# Patient Record
Sex: MALE | Race: BLACK OR AFRICAN AMERICAN | Employment: FULL TIME | URBAN - METROPOLITAN AREA
[De-identification: names, ages, dates, MRNs, and addresses within clinical notes are randomized per-mention and may not be internally consistent; named-entity substitution may affect disease eponyms.]

---

## 2017-01-17 ENCOUNTER — OFFICE VISIT (OUTPATIENT)
Dept: FAMILY MEDICINE CLINIC | Facility: CLINIC | Age: 52
End: 2017-01-17

## 2017-01-17 VITALS
SYSTOLIC BLOOD PRESSURE: 154 MMHG | DIASTOLIC BLOOD PRESSURE: 98 MMHG | BODY MASS INDEX: 37 KG/M2 | HEART RATE: 68 BPM | WEIGHT: 280 LBS | RESPIRATION RATE: 20 BRPM

## 2017-01-17 DIAGNOSIS — R94.31 ABNORMAL EKG: ICD-10-CM

## 2017-01-17 DIAGNOSIS — I10 ESSENTIAL HYPERTENSION: Primary | ICD-10-CM

## 2017-01-17 DIAGNOSIS — M99.02 THORACIC REGION SOMATIC DYSFUNCTION: ICD-10-CM

## 2017-01-17 PROBLEM — R03.0 ELEVATED BLOOD PRESSURE READING: Status: ACTIVE | Noted: 2017-01-17

## 2017-01-17 PROCEDURE — 99214 OFFICE O/P EST MOD 30 MIN: CPT | Performed by: FAMILY MEDICINE

## 2017-01-17 PROCEDURE — 93010 ELECTROCARDIOGRAM REPORT: CPT | Performed by: FAMILY MEDICINE

## 2017-01-17 PROCEDURE — 93005 ELECTROCARDIOGRAM TRACING: CPT | Performed by: FAMILY MEDICINE

## 2017-01-17 PROCEDURE — 98927 OSTEOPATH MANJ 5-6 REGIONS: CPT | Performed by: FAMILY MEDICINE

## 2017-01-17 RX ORDER — LOSARTAN POTASSIUM AND HYDROCHLOROTHIAZIDE 12.5; 5 MG/1; MG/1
1 TABLET ORAL DAILY
Qty: 30 TABLET | Refills: 1 | Status: SHIPPED | OUTPATIENT
Start: 2017-01-17 | End: 2017-04-05

## 2017-01-17 RX ORDER — HYDROCODONE BITARTRATE AND ACETAMINOPHEN 5; 325 MG/1; MG/1
1 TABLET ORAL EVERY 6 HOURS PRN
Qty: 40 TABLET | Refills: 0 | Status: SHIPPED | OUTPATIENT
Start: 2017-01-17 | End: 2017-02-16

## 2017-01-17 NOTE — PROCEDURES
Multiple vertebral segments in thoracic region misaligned. Manual osteopathic manipulative therapy performed and immediate relief obtained. Patient instantaneously improved.

## 2017-01-17 NOTE — PATIENT INSTRUCTIONS
Monitor blood pressures and record at home. Limit salt intake. EKG ordered. Recommend weight loss via daily exercising and consistent healthy dietary changes. Start losartan/HCTZ 50/12.5 mg. Monitor blood pressures at home and record.  Will correspond with

## 2017-01-17 NOTE — PROGRESS NOTES
HPI:    Patient ID: Ivelisse Mitchell is a 49year old male. HPI Comments: Dental extraction cancelled today secondary to elevated blood pressures measured at the dental office.     Blood Pressure  Chronicity: For some time wax and waning (borderline) T dizziness, weakness, numbness and headaches. Current Outpatient Prescriptions:  Fluticasone Propionate 50 MCG/ACT Nasal Suspension 2 sprays by Each Nare route daily.  Disp: 1 Bottle Rfl: 0     Allergies:No Known Allergies     01/17/17  0570 01/17 Referrals:  ELECTROCARDIOGRAM, COMPLETE  Patient Instructions   Monitor blood pressures and record at home. Limit salt intake. EKG ordered. Recommend weight loss via daily exercising and consistent healthy dietary changes. Start losartan/HCTZ 50/12.5 mg.  Aloha Frieze

## 2017-01-22 ENCOUNTER — APPOINTMENT (OUTPATIENT)
Dept: GENERAL RADIOLOGY | Facility: HOSPITAL | Age: 52
End: 2017-01-22
Payer: COMMERCIAL

## 2017-01-22 ENCOUNTER — HOSPITAL ENCOUNTER (EMERGENCY)
Facility: HOSPITAL | Age: 52
Discharge: HOME | End: 2017-01-22
Payer: COMMERCIAL

## 2017-01-22 VITALS
RESPIRATION RATE: 16 BRPM | HEART RATE: 70 BPM | OXYGEN SATURATION: 97 % | SYSTOLIC BLOOD PRESSURE: 123 MMHG | HEIGHT: 74 IN | DIASTOLIC BLOOD PRESSURE: 64 MMHG | TEMPERATURE: 103 F | WEIGHT: 269 LBS | BODY MASS INDEX: 34.52 KG/M2

## 2017-01-22 DIAGNOSIS — J18.9 COMMUNITY ACQUIRED PNEUMONIA: Primary | ICD-10-CM

## 2017-01-22 LAB
BASOPHILS # BLD: 0.1 K/UL (ref 0–0.2)
BASOPHILS NFR BLD: 1 %
EOSINOPHIL # BLD: 0.2 K/UL (ref 0–0.7)
EOSINOPHIL NFR BLD: 3 %
ERYTHROCYTE [DISTWIDTH] IN BLOOD BY AUTOMATED COUNT: 12.8 % (ref 11–15)
HCT VFR BLD AUTO: 44.4 % (ref 41–52)
HGB BLD-MCNC: 14.8 G/DL (ref 13.5–17.5)
LYMPHOCYTES # BLD: 0.8 K/UL (ref 1–4)
LYMPHOCYTES NFR BLD: 10 %
MCH RBC QN AUTO: 27.6 PG (ref 27–32)
MCHC RBC AUTO-ENTMCNC: 33.5 G/DL (ref 32–37)
MCV RBC AUTO: 82.4 FL (ref 80–100)
MONOCYTES # BLD: 1.2 K/UL (ref 0–1)
MONOCYTES NFR BLD: 15 %
NEUTROPHILS # BLD AUTO: 5.8 K/UL (ref 1.8–7.7)
NEUTROPHILS NFR BLD: 71 %
PLATELET # BLD AUTO: 169 K/UL (ref 140–400)
PMV BLD AUTO: 8.1 FL (ref 7.4–10.3)
RBC # BLD AUTO: 5.38 M/UL (ref 4.5–5.9)
WBC # BLD AUTO: 8.1 K/UL (ref 4–11)

## 2017-01-22 PROCEDURE — 85025 COMPLETE CBC W/AUTO DIFF WBC: CPT | Performed by: EMERGENCY MEDICINE

## 2017-01-22 PROCEDURE — 99284 EMERGENCY DEPT VISIT MOD MDM: CPT

## 2017-01-22 PROCEDURE — 87040 BLOOD CULTURE FOR BACTERIA: CPT | Performed by: EMERGENCY MEDICINE

## 2017-01-22 PROCEDURE — 96365 THER/PROPH/DIAG IV INF INIT: CPT

## 2017-01-22 PROCEDURE — 71020 XR CHEST PA + LAT CHEST (CPT=71020): CPT

## 2017-01-22 PROCEDURE — 36415 COLL VENOUS BLD VENIPUNCTURE: CPT

## 2017-01-22 RX ORDER — ACETAMINOPHEN 500 MG
1000 TABLET ORAL ONCE
Status: COMPLETED | OUTPATIENT
Start: 2017-01-22 | End: 2017-01-22

## 2017-01-22 RX ORDER — IBUPROFEN 600 MG/1
600 TABLET ORAL ONCE
Status: COMPLETED | OUTPATIENT
Start: 2017-01-22 | End: 2017-01-22

## 2017-01-22 RX ORDER — AZITHROMYCIN 250 MG/1
TABLET, FILM COATED ORAL
Qty: 1 PACKAGE | Refills: 0 | Status: SHIPPED | OUTPATIENT
Start: 2017-01-22 | End: 2017-01-27

## 2017-01-22 RX ORDER — TRAMADOL HYDROCHLORIDE 50 MG/1
50 TABLET ORAL EVERY 4 HOURS PRN
Qty: 14 TABLET | Refills: 0 | Status: SHIPPED | OUTPATIENT
Start: 2017-01-22 | End: 2017-01-29

## 2017-01-22 RX ORDER — BENZONATATE 100 MG/1
100 CAPSULE ORAL 3 TIMES DAILY PRN
Qty: 30 CAPSULE | Refills: 0 | Status: SHIPPED | OUTPATIENT
Start: 2017-01-22 | End: 2017-02-16

## 2017-01-22 RX ORDER — PREDNISONE 20 MG/1
40 TABLET ORAL DAILY
Qty: 6 TABLET | Refills: 0 | Status: SHIPPED | OUTPATIENT
Start: 2017-01-22 | End: 2017-01-25

## 2017-01-22 NOTE — ED PROVIDER NOTES
Patient Seen in: [facility] AND [facility] Emergency Department    History   Patient presents with:  Cough    Stated Complaint: Shortness of Breath, headaches, chest pains, cough    HPI    Patient complains of shortness of breath that began few days ago.   Micah Burrell reviewed and negative except as noted above. PSFH elements reviewed from today and agreed except as otherwise stated in HPI.     Physical Exam       ED Triage Vitals   BP 01/22/17 0053 135/75 mmHg   Pulse 01/22/17 0053 79   Resp 01/22/17 0053 16   Temp 0 acquired pneumonia  (primary encounter diagnosis)    Disposition:  Discharge    Follow-up:  Millie Cortez DO  [address] [place] [address] [id number] [facility]  [phone]            Medications Prescribed:  Discharge Medication List as of 1/22/2017

## 2017-02-06 ENCOUNTER — TELEPHONE (OUTPATIENT)
Dept: FAMILY MEDICINE CLINIC | Facility: CLINIC | Age: 52
End: 2017-02-06

## 2017-02-06 NOTE — TELEPHONE ENCOUNTER
Patient asking if he can get an apt with  re: his Blood pressure is ok to get a tooth pulled. They will not pull his tooth till they get OK from the doctor re: blood pressure.    The sooner he gets response he can get his tooth pulled

## 2017-02-16 ENCOUNTER — OFFICE VISIT (OUTPATIENT)
Dept: FAMILY MEDICINE CLINIC | Facility: CLINIC | Age: 52
End: 2017-02-16

## 2017-02-16 VITALS
DIASTOLIC BLOOD PRESSURE: 92 MMHG | HEIGHT: 73 IN | RESPIRATION RATE: 16 BRPM | HEART RATE: 80 BPM | SYSTOLIC BLOOD PRESSURE: 130 MMHG | TEMPERATURE: 99 F

## 2017-02-16 DIAGNOSIS — I10 ESSENTIAL HYPERTENSION: Primary | ICD-10-CM

## 2017-02-16 PROCEDURE — 99213 OFFICE O/P EST LOW 20 MIN: CPT | Performed by: FAMILY MEDICINE

## 2017-02-16 PROCEDURE — 99212 OFFICE O/P EST SF 10 MIN: CPT | Performed by: FAMILY MEDICINE

## 2017-02-16 NOTE — PROGRESS NOTES
HPI:    Patient ID: Marcella Art is a 49year old male. HPI Comments: Here for clearance for planned tooth extraction. Blood Pressure  This is a new problem. The current episode started more than 1 month ago. The problem occurs intermittently.

## 2017-02-16 NOTE — PATIENT INSTRUCTIONS
Comply with medications. Monitor blood pressures and record at home. Limit salt intake. Cleared for extraction.

## 2017-04-05 ENCOUNTER — TELEPHONE (OUTPATIENT)
Dept: FAMILY MEDICINE CLINIC | Facility: CLINIC | Age: 52
End: 2017-04-05

## 2017-04-05 RX ORDER — LOSARTAN POTASSIUM AND HYDROCHLOROTHIAZIDE 12.5; 5 MG/1; MG/1
1 TABLET ORAL DAILY
Qty: 30 TABLET | Refills: 1 | Status: SHIPPED | OUTPATIENT
Start: 2017-04-05 | End: 2018-04-03 | Stop reason: ALTCHOICE

## 2017-04-05 NOTE — TELEPHONE ENCOUNTER
Hypertensive Medications: Refill protocol failed because the patient did not meet the protocol criteria.  Please advise in regards to refill request     Protocol Criteria:  · Appointment scheduled in the past 6 months or in the next 3 months  · BMP or CMP i

## 2017-04-05 NOTE — TELEPHONE ENCOUNTER
Dr.Weiler for  see Bettina Phoenix message below  pt called again and stated that his gums are swollen and was seen in the ER 1/2017 he was given z-nury and tramadol and it worked for his swollen gums. He wants this medication to be prescribed today.

## 2017-04-05 NOTE — TELEPHONE ENCOUNTER
Please advise. Thank you. To Dr. Joseph Vance for Dr. Osvaldo Rascon out of office    Pt was called back and MD message below relayed to him but he states that he is very upset and wants to speak with the doctor directly today and he does not want a nurse to call him back.

## 2017-04-05 NOTE — TELEPHONE ENCOUNTER
Patient is asking for the antibiotic that the ER wrote for h im  And he out of his BP medication.  DOES NOT KNOW THE NAME HE KEEPS STATING IT SHOULD BE IN THE FILE

## 2017-04-05 NOTE — TELEPHONE ENCOUNTER
I don't like to prescribe antibiotics over the phone, especially for a problem I have not seen.   He can wait for JOURDAN to reiew tomorrow as well

## 2017-04-05 NOTE — TELEPHONE ENCOUNTER
(Please see below as well )    Patient states this has been an issue since January - states has not been able to get tooth pulled. States at first it was his blood pressure that he could not get his tooth pulled and then it became an insurance issue.  State

## 2017-04-06 NOTE — TELEPHONE ENCOUNTER
Dr Lino Quach is right. This is a dental issue which is impeding a dental procedure and we cannot presume anything without an evaluation. He should contact his dentist or give him a same day with me.  DO NOT PUT THIS PATIENT OR ANY PATIENT IN MY 04/07/17 1:40 p

## 2017-04-06 NOTE — TELEPHONE ENCOUNTER
Pt returned call, reviewed doctor's recommendations for contacting dentist or scheduling appt. Pt states he will contact his dentist and call back if further assistance needed.

## 2017-04-06 NOTE — TELEPHONE ENCOUNTER
Spoke with patient. Patient is very ipset with me. Informed him that I reviewed the ER report and that he was treat for CAP. Pt states he was given Zpack for tooth infection. Diagnosis not listed.   I informed pt that zpack is not typically used for dent

## 2018-02-09 ENCOUNTER — NURSE TRIAGE (OUTPATIENT)
Dept: OTHER | Age: 53
End: 2018-02-09

## 2018-02-10 ENCOUNTER — OFFICE VISIT (OUTPATIENT)
Dept: FAMILY MEDICINE CLINIC | Facility: CLINIC | Age: 53
End: 2018-02-10

## 2018-02-10 VITALS
HEIGHT: 75 IN | BODY MASS INDEX: 33.32 KG/M2 | TEMPERATURE: 100 F | WEIGHT: 268 LBS | DIASTOLIC BLOOD PRESSURE: 94 MMHG | SYSTOLIC BLOOD PRESSURE: 132 MMHG | HEART RATE: 85 BPM | RESPIRATION RATE: 14 BRPM

## 2018-02-10 DIAGNOSIS — J01.90 ACUTE SINUSITIS, RECURRENCE NOT SPECIFIED, UNSPECIFIED LOCATION: Primary | ICD-10-CM

## 2018-02-10 PROCEDURE — 99212 OFFICE O/P EST SF 10 MIN: CPT | Performed by: FAMILY MEDICINE

## 2018-02-10 PROCEDURE — 99213 OFFICE O/P EST LOW 20 MIN: CPT | Performed by: FAMILY MEDICINE

## 2018-02-10 RX ORDER — AMOXICILLIN AND CLAVULANATE POTASSIUM 875; 125 MG/1; MG/1
1 TABLET, FILM COATED ORAL 2 TIMES DAILY
Qty: 20 TABLET | Refills: 0 | Status: SHIPPED | OUTPATIENT
Start: 2018-02-10 | End: 2018-02-20

## 2018-02-10 NOTE — PROGRESS NOTES
HPI:    Patient ID: Glenn Doss is a 47year old male. Sinus Problem   This is a new problem. The current episode started in the past 7 days. The problem is unchanged. The pain is mild.        Review of Systems         Current Outpatient Prescript

## 2018-02-12 ENCOUNTER — NURSE TRIAGE (OUTPATIENT)
Dept: OTHER | Age: 53
End: 2018-02-12

## 2018-02-12 RX ORDER — AZITHROMYCIN 250 MG/1
TABLET, FILM COATED ORAL
Qty: 6 TABLET | Refills: 0 | Status: SHIPPED | OUTPATIENT
Start: 2018-02-12 | End: 2018-04-03 | Stop reason: ALTCHOICE

## 2018-02-12 NOTE — TELEPHONE ENCOUNTER
Action Requested: Summary for Provider     []  Critical Lab, Recommendations Needed  [] Need Additional Advice  []   FYI    []   Need Orders  [] Need Medications Sent to Pharmacy  []  Other     SUMMARY: Dr. Gabo Jackson: patient asked if different abx can be rec

## 2018-04-03 ENCOUNTER — LAB ENCOUNTER (OUTPATIENT)
Dept: LAB | Age: 53
End: 2018-04-03
Attending: FAMILY MEDICINE
Payer: COMMERCIAL

## 2018-04-03 ENCOUNTER — OFFICE VISIT (OUTPATIENT)
Dept: FAMILY MEDICINE CLINIC | Facility: CLINIC | Age: 53
End: 2018-04-03

## 2018-04-03 VITALS
RESPIRATION RATE: 17 BRPM | SYSTOLIC BLOOD PRESSURE: 116 MMHG | HEART RATE: 76 BPM | DIASTOLIC BLOOD PRESSURE: 82 MMHG | HEIGHT: 75 IN | TEMPERATURE: 99 F | BODY MASS INDEX: 33.82 KG/M2 | WEIGHT: 272 LBS

## 2018-04-03 DIAGNOSIS — Z13.29 THYROID DISORDER SCREEN: ICD-10-CM

## 2018-04-03 DIAGNOSIS — Z11.3 SCREEN FOR STD (SEXUALLY TRANSMITTED DISEASE): ICD-10-CM

## 2018-04-03 DIAGNOSIS — Z13.220 LIPID SCREENING: ICD-10-CM

## 2018-04-03 DIAGNOSIS — Z12.5 PROSTATE CANCER SCREENING: ICD-10-CM

## 2018-04-03 DIAGNOSIS — Z00.00 ROUTINE PHYSICAL EXAMINATION: Primary | ICD-10-CM

## 2018-04-03 DIAGNOSIS — Z00.00 ROUTINE PHYSICAL EXAMINATION: ICD-10-CM

## 2018-04-03 PROCEDURE — 36415 COLL VENOUS BLD VENIPUNCTURE: CPT

## 2018-04-03 PROCEDURE — 80050 GENERAL HEALTH PANEL: CPT

## 2018-04-03 PROCEDURE — 80061 LIPID PANEL: CPT

## 2018-04-03 PROCEDURE — 81003 URINALYSIS AUTO W/O SCOPE: CPT

## 2018-04-03 PROCEDURE — 87591 N.GONORRHOEAE DNA AMP PROB: CPT

## 2018-04-03 PROCEDURE — 99396 PREV VISIT EST AGE 40-64: CPT | Performed by: FAMILY MEDICINE

## 2018-04-03 PROCEDURE — 85025 COMPLETE CBC W/AUTO DIFF WBC: CPT

## 2018-04-03 PROCEDURE — 84443 ASSAY THYROID STIM HORMONE: CPT

## 2018-04-03 PROCEDURE — 87491 CHLMYD TRACH DNA AMP PROBE: CPT

## 2018-04-03 RX ORDER — METRONIDAZOLE 500 MG/1
TABLET ORAL
Qty: 4 TABLET | Refills: 0 | Status: SHIPPED | OUTPATIENT
Start: 2018-04-03

## 2018-04-03 NOTE — PROGRESS NOTES
HPI:    Patient ID: Lavinia De Santiago is a 47year old male.     47year old AA male here for complete preventive care physical and for status update on any confirmed chronic medical illnesses and follow up on any previous labs or procedures that were sugg TSH W REFLEX TO FREE T4; Future    5.  STD screen  Chlamydia/gonorrhea tested      Orders Placed This Encounter      Lipid Panel [E]      Comp Metabolic Panel (14) [E]      CBC W Differential W Platelet [E]      TSH W Reflex To Free T4 [E]      Urinalysis,

## 2018-06-07 ENCOUNTER — TELEPHONE (OUTPATIENT)
Dept: FAMILY MEDICINE CLINIC | Facility: CLINIC | Age: 53
End: 2018-06-07

## 2018-06-11 ENCOUNTER — NURSE ONLY (OUTPATIENT)
Dept: FAMILY MEDICINE CLINIC | Facility: CLINIC | Age: 53
End: 2018-06-11

## 2018-06-11 DIAGNOSIS — R94.31 ABNORMAL FINDING ON EKG: Primary | ICD-10-CM

## 2018-06-11 DIAGNOSIS — I10 ESSENTIAL HYPERTENSION: ICD-10-CM

## 2018-06-11 DIAGNOSIS — R94.31 EKG, ABNORMAL: Primary | ICD-10-CM

## 2018-06-20 ENCOUNTER — HOSPITAL ENCOUNTER (OUTPATIENT)
Dept: CV DIAGNOSTICS | Facility: HOSPITAL | Age: 53
Discharge: HOME | End: 2018-06-20
Attending: FAMILY MEDICINE
Payer: COMMERCIAL

## 2018-06-20 DIAGNOSIS — R94.31 EKG, ABNORMAL: ICD-10-CM

## 2018-06-20 DIAGNOSIS — I10 ESSENTIAL HYPERTENSION: ICD-10-CM

## 2018-06-20 PROCEDURE — 93016 CV STRESS TEST SUPVJ ONLY: CPT | Performed by: FAMILY MEDICINE

## 2018-06-20 PROCEDURE — 93017 CV STRESS TEST TRACING ONLY: CPT | Performed by: FAMILY MEDICINE

## 2018-06-20 PROCEDURE — 93018 CV STRESS TEST I&R ONLY: CPT | Performed by: FAMILY MEDICINE

## 2018-09-11 ENCOUNTER — MED REC SCAN ONLY (OUTPATIENT)
Dept: FAMILY MEDICINE CLINIC | Facility: CLINIC | Age: 53
End: 2018-09-11

## 2020-11-23 ENCOUNTER — TELEPHONE (OUTPATIENT)
Dept: FAMILY MEDICINE CLINIC | Facility: CLINIC | Age: 55
End: 2020-11-23

## 2020-11-23 NOTE — TELEPHONE ENCOUNTER
Per patient requesting to know if her could  get in sooner then 01/25/2021  For a physical / that's Theo Terrazas next available appointment / please advise.

## 2022-10-19 NOTE — TELEPHONE ENCOUNTER
Call transferred to RN from [address] [place] [address]. Informed pt BP med refilled and reviewed doctor's recommendations regarding antibiotic prescription.  Pt is insistent that an antibiotic be prescribed for him, states this was prescribed for him in ER and he doesn't need to Pt is requesting a c/b regarding prescription of Norco.  He reports he filled his prescription last month on the 19th, but there is a do not fill date until 10/22/22 on his prescription for this month.  Pt is scheduled to have MARLYN tomorrow and reports he needed to fill his prescription prior to procedure.  # [phone]

## 2024-01-08 ENCOUNTER — TELEPHONE (OUTPATIENT)
Dept: FAMILY MEDICINE CLINIC | Facility: CLINIC | Age: 59
End: 2024-01-08

## 2024-01-08 NOTE — TELEPHONE ENCOUNTER
Routing as CECILY.      Patient states that our office called him today. He did not want to give me his birthday at first to pull up his chart. I advised there is more than one Avel Rivas in our system and I need to pull up the right chart in order to help him. He disagreed with me but gave me his birthday anyways. He has an appointment scheduled for a new patient physical today and states he already spoke to someone. I do not have any documentation as to who called patient and advised that it might have been scheduling to remind him to schedule his physical.     He has complaints about scheduling. States that he can never get an appointment with Dr. Saavedra. He states he knows Dr. Saavedra personally because they went to school together. Also states that multiple friends of theirs have the same problem. The only recent documentation I can see is from 11/23/20 of him asking to be seen sooner for an appointment for physical. He states that call was not for a physical and it was to check to see if he had Covid. There is no triage encounter for this to triage patient for Covid. I also advised that the last time patient was seen in the office was in 2018. He states he has not seen Dr. Saavedra because of this reason and has been following up with the [facility]. I advised him that Dr. Saavedra's schedule fills up quickly and to try to schedule his physicals 6 months out in order to ensure he gets the time/date he prefers. I also recommended that if he has issues with the scheduling department he can always pick the option to speak to a nurse if he has an acute issue. I also sent patient a link to sign up for [facility]. Patient verbalized understanding and disconnected call.    Future Appointments   Date Time Provider Department Center   1/8/2024  6:00 PM [facility]

## 2024-03-12 ENCOUNTER — TELEPHONE (OUTPATIENT)
Dept: FAMILY MEDICINE CLINIC | Facility: CLINIC | Age: 59
End: 2024-03-12

## 2024-03-12 NOTE — TELEPHONE ENCOUNTER
Patient called and is requesting appt with  decline other providers he said he develop a hernia and has been hurting,  next available is in June. Please advise. He said he has spoken to  last month.

## 2024-03-14 ENCOUNTER — OFFICE VISIT (OUTPATIENT)
Dept: FAMILY MEDICINE CLINIC | Facility: CLINIC | Age: 59
End: 2024-03-14

## 2024-03-14 VITALS
DIASTOLIC BLOOD PRESSURE: 79 MMHG | SYSTOLIC BLOOD PRESSURE: 120 MMHG | BODY MASS INDEX: 28 KG/M2 | WEIGHT: 221 LBS | HEART RATE: 74 BPM

## 2024-03-14 DIAGNOSIS — Z12.5 ENCOUNTER FOR PROSTATE CANCER SCREENING: ICD-10-CM

## 2024-03-14 DIAGNOSIS — Z28.21 TETANUS, DIPHTHERIA, AND ACELLULAR PERTUSSIS (TDAP) VACCINATION DECLINED: ICD-10-CM

## 2024-03-14 DIAGNOSIS — Z28.21 HERPES ZOSTER VACCINATION DECLINED: ICD-10-CM

## 2024-03-14 DIAGNOSIS — E11.65 UNCONTROLLED TYPE 2 DIABETES MELLITUS WITH HYPERGLYCEMIA (HCC): Primary | ICD-10-CM

## 2024-03-14 DIAGNOSIS — Z12.11 COLON CANCER SCREENING: ICD-10-CM

## 2024-03-14 DIAGNOSIS — R73.09 ELEVATED HEMOGLOBIN A1C MEASUREMENT: ICD-10-CM

## 2024-03-14 DIAGNOSIS — K40.90 NON-RECURRENT UNILATERAL INGUINAL HERNIA WITHOUT OBSTRUCTION OR GANGRENE: ICD-10-CM

## 2024-03-14 DIAGNOSIS — Z13.29 THYROID DISORDER SCREEN: ICD-10-CM

## 2024-03-14 LAB — HEMOGLOBIN A1C: 15 % (ref 4.3–5.6)

## 2024-03-14 PROCEDURE — 3046F HEMOGLOBIN A1C LEVEL >9.0%: CPT | Performed by: FAMILY MEDICINE

## 2024-03-14 PROCEDURE — 3074F SYST BP LT 130 MM HG: CPT | Performed by: FAMILY MEDICINE

## 2024-03-14 PROCEDURE — 99214 OFFICE O/P EST MOD 30 MIN: CPT | Performed by: FAMILY MEDICINE

## 2024-03-14 PROCEDURE — 3078F DIAST BP <80 MM HG: CPT | Performed by: FAMILY MEDICINE

## 2024-03-14 PROCEDURE — 83036 HEMOGLOBIN GLYCOSYLATED A1C: CPT | Performed by: FAMILY MEDICINE

## 2024-03-14 NOTE — PROGRESS NOTES
Subjective:     Patient ID: Avel Rivas is a 58 year old male.    This patient is a 58-year-old -American gentleman who has not been seen by me at this clinic for some time who presents today for a referral to general surgery as he is pretty sure that he has developed a right inguinal hernia which is work-related.  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  Some days to weeks later the discomfort persisted at which point he noted a bulging in the right suprapubic region.    Additionally the patient reports dry mouth and urinary frequency and a reported elevated high triglyceride, and elevated blood sugars.  The patient had a hospitalization which revealed an A1c of 10.4 and was initially placed on insulin for blood sugar controls.  Patient states that he modified his oral intake activity and adjusted to a healthy lifestyle and feels as though he is doing well concerning his diabetes.    In spite of what he thinks, his blood sugars have been measuring high.  Patient needs an updated assessment regarding his diabetes.          History/Other:   Review of Systems  No current outpatient medications on file.     Allergies:No Known Allergies    Past Medical History:   Diagnosis Date    Hypertension     Lipid screening 11/8/13      Past Surgical History:   Procedure Laterality Date    ELECTROCARDIOGRAM, COMPLETE  11/8/13    Scanned to Media Tab    HERNIA SURGERY  2002    Per NG Hernia repair    TONSILLECTOMY  2008      Family History   Problem Relation Age of Onset    Breast Cancer Mother 58    Melanoma Father 58        Per [facility] Cancer - melanoma      Social History:   Social History     Socioeconomic History    Marital status: Single   Tobacco Use    Smoking status: Never    Smokeless tobacco: Never   Substance and Sexual Activity    Alcohol use: Yes     Alcohol/week: 2.0 standard drinks of  alcohol     Types: 2 Cans of beer per week    Drug use: No   Other Topics Concern    Caffeine Concern Yes     Comment: Soda        Objective:   Vitals:    03/14/24 1105   BP: 120/79   Pulse: 74       Physical Exam  HENT:      Head: Normocephalic and atraumatic.      Right Ear: Tympanic membrane normal.      Left Ear: Tympanic membrane normal.      Nose: Nose normal.      Mouth/Throat:      Mouth: Mucous membranes are moist.   Neck:      Thyroid: No thyromegaly.   Cardiovascular:      Rate and Rhythm: Normal rate and regular rhythm.      Heart sounds:      No gallop.   Pulmonary:      Effort: Pulmonary effort is normal. No respiratory distress.      Breath sounds: Normal breath sounds.   Abdominal:      Hernia: A hernia is present. Hernia is present in the right inguinal area.          Comments: Direct/indirect right inguinal hernia as depicted.   Neurological:      Mental Status: He is alert and oriented to person, place, and time.         Assessment & Plan:   1. Uncontrolled type 2 diabetes mellitus with hyperglycemia (HCC)  The following labs have been ordered.  Patient has been referred to diabetic navigator along with endocrinology for optimal diabetic care.  - Urinalysis, Routine; Future  - CBC With Differential With Platelet; Future  - Comp Metabolic Panel (14); Future  - Lipid Panel; Future  - Protein,Total,Urine, Random [E]; Future  - Diabetes Navigator  - Endocrine Referral - [place] ([place])    2. Elevated hemoglobin A1c measurement  A1c on today greater than 15.  Poor control.  See #1.  - Hemoglobin A1C; Standing  - Hemoglobin A1C  - POC Glycohemoglobin [72015]    3. Non-recurrent unilateral inguinal hernia without obstruction or gangrene  Patient being referred, however surgery will not be able to take place until blood sugar is showing a respectable trend of control.  - Surgery Referral - In Network    4. Colon cancer screening  Referred.  - Gastro GI Telephone Colon Screen; Future    5. Thyroid  disorder screen  Screened.  - TSH W Reflex To Free T4; Future    6. Encounter for prostate cancer screening  Screened.  - PSA Total, Screen; Future    7. Tetanus, diphtheria, and acellular pertussis (Tdap) vaccination declined  Tdap declined.    8. Herpes zoster vaccination declined  Tdap declined.        No orders of the defined types were placed in this encounter.      Meds This Visit:  Requested Prescriptions      No prescriptions requested or ordered in this encounter       Imaging & Referrals:  OP REFERRAL TO [facility] GI TELEPHONE COLON SCREEN     Patient Instructions   All adult screening ordered and done appropriate for patient's age and gender and risk factors and complaints.  Encouraged safe physical fitness and daily physical activity daily.  Referred to endocrine, general surgery, and diabetic navigator.    Return in about 6 weeks (around 4/25/2024), or if symptoms worsen or fail to improve.

## 2024-03-14 NOTE — PATIENT INSTRUCTIONS
All adult screening ordered and done appropriate for patient's age and gender and risk factors and complaints.  Encouraged safe physical fitness and daily physical activity daily.  Referred to endocrine, general surgery, and diabetic navigator.

## 2024-03-22 ENCOUNTER — TELEPHONE (OUTPATIENT)
Dept: ENDOCRINOLOGY | Facility: HOSPITAL | Age: 59
End: 2024-03-22

## 2024-04-22 ENCOUNTER — OFFICE VISIT (OUTPATIENT)
Dept: ENDOCRINOLOGY CLINIC | Facility: CLINIC | Age: 59
End: 2024-04-22
Payer: COMMERCIAL

## 2024-04-22 VITALS
WEIGHT: 222 LBS | BODY MASS INDEX: 27.6 KG/M2 | SYSTOLIC BLOOD PRESSURE: 117 MMHG | DIASTOLIC BLOOD PRESSURE: 70 MMHG | HEART RATE: 83 BPM | HEIGHT: 75 IN

## 2024-04-22 DIAGNOSIS — R03.0 ELEVATED BLOOD PRESSURE READING: Primary | ICD-10-CM

## 2024-04-22 DIAGNOSIS — R73.09 HIGH GLUCOSE LEVEL: ICD-10-CM

## 2024-04-22 DIAGNOSIS — E11.65 UNCONTROLLED TYPE 2 DIABETES MELLITUS WITH HYPERGLYCEMIA (HCC): ICD-10-CM

## 2024-04-22 DIAGNOSIS — R79.89 LOW VITAMIN D LEVEL: ICD-10-CM

## 2024-04-22 LAB
TEST STRIP EXPIRATION DATE: NORMAL DATE
TEST STRIP LOT #: NORMAL NUMERIC

## 2024-04-22 RX ORDER — INSULIN GLARGINE 100 [IU]/ML
20 INJECTION, SOLUTION SUBCUTANEOUS NIGHTLY
Qty: 18 ML | Refills: 0 | Status: SHIPPED | OUTPATIENT
Start: 2024-04-22 | End: 2024-07-21

## 2024-04-22 RX ORDER — BLOOD SUGAR DIAGNOSTIC
1 STRIP MISCELLANEOUS 3 TIMES DAILY
Qty: 300 STRIP | Refills: 1 | Status: SHIPPED | OUTPATIENT
Start: 2024-04-22 | End: 2024-07-21

## 2024-04-22 RX ORDER — METFORMIN HYDROCHLORIDE 500 MG/1
1000 TABLET, EXTENDED RELEASE ORAL 2 TIMES DAILY WITH MEALS
Qty: 360 TABLET | Refills: 1 | Status: SHIPPED | OUTPATIENT
Start: 2024-04-22 | End: 2024-10-19

## 2024-04-22 RX ORDER — BLOOD SUGAR DIAGNOSTIC
1 STRIP MISCELLANEOUS 4 TIMES DAILY
Qty: 100 EACH | Refills: 2 | Status: SHIPPED | OUTPATIENT
Start: 2024-04-22 | End: 2024-07-21

## 2024-04-22 RX ORDER — BLOOD-GLUCOSE,RECEIVER,CONT
1 EACH MISCELLANEOUS ONCE
Qty: 1 EACH | Refills: 0 | Status: SHIPPED | OUTPATIENT
Start: 2024-04-22 | End: 2024-04-22

## 2024-04-22 RX ORDER — BLOOD-GLUCOSE SENSOR
1 EACH MISCELLANEOUS
Qty: 6 EACH | Refills: 1 | Status: SHIPPED | OUTPATIENT
Start: 2024-04-22 | End: 2024-07-21

## 2024-04-22 RX ORDER — PIOGLITAZONEHYDROCHLORIDE 30 MG/1
30 TABLET ORAL DAILY
Qty: 90 TABLET | Refills: 1 | Status: SHIPPED | OUTPATIENT
Start: 2024-04-22 | End: 2024-10-19

## 2024-04-22 RX ORDER — EMPAGLIFLOZIN 25 MG/1
1 TABLET, FILM COATED ORAL DAILY
Qty: 30 TABLET | Refills: 1 | Status: SHIPPED | OUTPATIENT
Start: 2024-04-22 | End: 2024-05-22

## 2024-04-22 RX ORDER — GLIPIZIDE 5 MG/1
5 TABLET ORAL
Qty: 60 TABLET | Refills: 2 | Status: SHIPPED | OUTPATIENT
Start: 2024-04-22 | End: 2024-07-21

## 2024-04-22 NOTE — PROGRESS NOTES
New Patient Evaluation - History and Physical    CONSULT - Reason for Visit:  uncontrolled DM  Requesting Physician:   Silke Saavedra DO  CHIEF COMPLAINT:    Chief Complaint   Patient presents with    Diabetes     Lst A1c 15.0        HISTORY OF PRESENT ILLNESS:   Avel Rivas is a 59 year old male who presents with uncontrolled DM  Appt was 1145. Pt came to clinic 430 pm. We saw the pt since his labs showed very high BG  uncontrolled DM     DM HISTORY  Diagnosed: 9/2023   3/14/24 12:35 PM    HEMOGLOBIN A1C  4.3 - 5.6 % 15.0 Abnormal      Had lethergy, blurry vision, polyuria and polydipsia   Not on meds   Was on high carb diet   Now reports he is on better diet. BG in clinic was HHH. He had sugary drink before        CURRENT DIABETIC MEDICATIONS INCLUDE:  Not on meds    HISTORY OF DIABETES COMPLICATIONS: :  History of Retinopathy:  no eye exam   History of Neuropathy:  ?  History of Nephropathy:  ?    ASSOCIATED COMPLICATIONS:   HTN:  no  Hyperlipidemia: denied   CAD/ASCVD/PAD/CVA:  denied     HOME GLUCOSE READINGS:   none    MEALS:  Not compliant with diet rec       DM quality measures:  A1C/Blood pressure: as reported above   Last dilated eye exam: No data recorded   Exam shows retinopathy? No data recorded  Last diabetic foot exam: No data recorded  Dentist : recommend every 6m  Nephropathy screening:   ace /arb rx:   no  LIPID screening: Cholesterol Meds:      LDL Cholesterol: 117, done on 4/3/2018.          ASSESSMENT AND PLAN:  Avel Rivas is a 59 year old male who presents with uncontrolled DM    Plan   RTC in 4 weeks with me   Consult diabetes educator ASAP     Lantus insulin once a day 20 units daily   Pioglitazone 30 mg /day   Jardiance 25 mg daily   Jardaince 25 mg daily   Metformin 500 mg daily - titrate as tolerated   Metformin start with 500 mg daily for a week, increase to 500 mg twice a day for a week, then 1000 mg at night and 500 mg in the morning for a week, then increase to  1000 mg twice day. If getting diarrhea, wait and do not increase the dose till the diarrhea resolves.       Stop drinking sugary drinks       Will give CGM samples and send Rx        If you have low blood sugar <70, take 15 grams of carb (8 oz juice or regular soda) and recheck in 15 minutes.    Follow up with podiatry and eye doctor annually.   Patients need to wear covered shoes all the time and check feet daily.   Bring your meter/BG log to the next visit.      Target A1c 6.5%  Target BG   BEFORE MEAL 100-130mg/dl  2hrs AFTER MEAL less than 180mg/dl    GLP-1 agonists:  No personal or family history of MEN syndrome   No personal history pancreatitis   Patient counselled regarding side effects including injection site reactions, nausea, vomiting, diarrhea, pancreatitis, gastroparesis and rare side effect kwame Kj syndrome.    SGLT2 inhibitors  No UTI or yeast infection   Discussed side effects including UTI and fungal infections.   Discussed the importance of hydration.          We discussed these in detail with the patient and negotiated which of numerous possible changes in the in the treatment plan that would be acceptable to them. The patient remains at ongoing is at high risk for complications related to uncontrolled diabetes and treatment.  The patient requires a great deal of self-management and support. We expect the patient's risk to be reduced with the changes to the treatment plan that we recommended today.        PAST MEDICAL HISTORY:   Past Medical History:    Hypertension    Lipid screening       PAST SURGICAL HISTORY:   Past Surgical History:   Procedure Laterality Date    Electrocardiogram, complete  11/8/13    Scanned to Media Tab    Hernia surgery  2002    Per NG Hernia repair    Tonsillectomy  2008       CURRENT MEDICATIONS:     Glucose Blood (BLOOD GLUCOSE TEST STRIPS 333) In Vitro Strip 1 each by In Vitro route 3 (three) times daily. 300 strip 1    Blood Gluc Meter Disp-Strips Does not  apply Device Check BG 3x/day 1 each 0    Continuous Blood Gluc Sensor (FREESTYLE GIOVANY 3 SENSOR) Does not apply Misc 1 each every 14 (fourteen) days. 6 each 1    Continuous Blood Gluc  (FREESTYLE GIOVANY 3 READER) Does not apply Misc 1 each once for 1 dose. 1 each 0    pioglitazone 30 MG Oral Tab Take 1 tablet (30 mg total) by mouth daily. 90 tablet 1    metFORMIN  MG Oral Tablet 24 Hr Take 2 tablets (1,000 mg total) by mouth 2 (two) times daily with meals. 360 tablet 1    insulin glargine (LANTUS SOLOSTAR) 100 UNIT/ML Subcutaneous Solution Pen-injector Inject 20 Units into the skin nightly. 18 mL 0    Insulin Pen Needle (PEN NEEDLES) 32G X 6 MM Does not apply Misc 1 each 4 (four) times daily. 100 each 2    glipiZIDE 5 MG Oral Tab Take 1 tablet (5 mg total) by mouth 2 (two) times daily before meals. 60 tablet 2    Empagliflozin (JARDIANCE) 25 MG Oral Tab Take 1 each by mouth daily. 30 tablet 1       ALLERGIES:  No Known Allergies    SOCIAL HISTORY:    Social History     Socioeconomic History    Marital status: Single   Tobacco Use    Smoking status: Never    Smokeless tobacco: Never   Substance and Sexual Activity    Alcohol use: Yes     Alcohol/week: 2.0 standard drinks of alcohol     Types: 2 Cans of beer per week    Drug use: No   Other Topics Concern    Caffeine Concern Yes     Comment: Soda      No smoking   occ etoh   No marijuana   No drugs   FAMILY HISTORY:   Family History   Problem Relation Age of Onset    Breast Cancer Mother 58    Melanoma Father 58        Per NG Cancer - melanoma   DM in GM side      REVIEW OF SYSTEMS:  All negative other than HPI    PHYSICAL EXAM:   Height: 6' 3\" (190.5 cm) (04/22 1625)  Weight: 222 lb (100.7 kg) (04/22 1625)  BSA (Calculated - sq m): 2.29 sq meters (04/22 1625)  Pulse: 83 (04/22 1625)  BP: 117/70 (04/22 1625)  Temp: --  Do Not Use - Resp Rate: --  SpO2: --    Body mass index is 27.75 kg/m².    CONSTITUTIONAL:  Awake and alert. Age appropriate,  good hygiene not in acute distress. Well-nourished and well developed. no acute distress   PSYCH:   Orientated to time, place, person & situation, Normal mood and affect, memory intact, normal insight and judgment, cooperative  Neuro: speech is clear. Awake, alert, no aphasia, no facial asymmetry, no nuchal rigidity  EYES:  No proptosis, no ptosis, conjunctiva normal  ENT:  Normocephalic, atraumatic  Eye: EOMI, normal lids, no discharge, no conjunctival erythema. No exophthalmos/proptosis, Ptosis negative   No rhinorrhea, moist oral mucosa  Neck: full range of motion  Neck/Thyroid: neck inspection: normal, No scar, No goiter   LUNGS:  No acute respiratory distress, non-labored respiration. Speaking full sentences  CARDIOVASCULAR:  regular rate   ABDOMEN:  No abdominal pain.   SKIN:  no bruising or bleeding, no rashes and no lesions, Skin is dry, no obvious rashes or lesions  EXTREMITIES: no gross abnormality   MSK: Moves extremities spontaneously. full range of motion in all major joints      DATA:     Pertinent data reviewed  9/6/23  9:42 AM    Hemoglobin A1C  <5.7 % of total Hgb 10.4 High            9/6/23  9:42 AM    Vit D, 25-Hydroxy  30 - 100 ng/mL 17 Low      9/6/23  9:42 AM    TSH w/reflex to FT4  0.40 - 4.50 mIU/L 0.98         9/9/23  6:58 AM    Glucose  60 - 99 mg/dL 286 High    Comment: Reference range for fasting plasma glucose only.   Sodium  135 - 145 mmol/L 139   Potassium  3.5 - 5.0 mmol/L 3.8   Chloride  98 - 108 mmol/L 102   Carbon Dioxide  23 - 30 mmol/L 25   Anion Gap  6 - 15 mmol/L 12   Blood Urea Nitrogen  7 - 20 mg/dL 16   Creatinine  0.50 - 1.40 mg/dL 0.92   eGFR Estimate, All  >=90 mL/min/1.73 sq. m 97      CXR 2017   CONCLUSION:   1. Scarring/atelectasis left base with small pneumonia not excluded.   2. Eventration right diaphragm.   3. Otherwise unremarkable examination.   4. A preliminary report was submitted and there is agreement without major discrepancies.        No results for  input(s): \"TSH\", \"T4F\", \"T3F\", \"THYP\" in the last 72 hours.  No results found.  POC HemoCue Glucose 201 (Finger stick glucose)        Component Value Flag Ref Range Units Status    GLUCOSE BLOOD [facility]        Final    Test Strip Lot # [id number]       Numeric Final    Test Strip Expiration Date 101,124       Date Final                  Orders Placed This Encounter   Procedures    POC HemoCue Glucose 201 (Finger stick glucose)    Microalb/Creat Ratio, Random Urine    Lipid Panel    Hemoglobin A1C    Comp Metabolic Panel (14)     Orders Placed This Encounter    POC HemoCue Glucose 201 (Finger stick glucose)     Order Specific Question:   Release to patient     Answer:   Immediate    Microalb/Creat Ratio, Random Urine     Standing Status:   Future     Standing Expiration Date:   2025     Order Specific Question:   Release to patient     Answer:   Immediate    Lipid Panel     Standing Status:   Future     Standing Expiration Date:   2025     Order Specific Question:   Release to patient     Answer:   Immediate    Hemoglobin A1C    Comp Metabolic Panel (14)    Glucose Blood (BLOOD GLUCOSE TEST STRIPS 333) In Vitro Strip     Si each by In Vitro route 3 (three) times daily.     Dispense:  300 strip     Refill:  1    Blood Gluc Meter Disp-Strips Does not apply Device     Sig: Check BG 3x/day     Dispense:  1 each     Refill:  0    Continuous Blood Gluc Sensor (FREESTYLE GIOVANY 3 SENSOR) Does not apply Misc     Si each every 14 (fourteen) days.     Dispense:  6 each     Refill:  1    Continuous Blood Gluc  (FREESTYLE GIOVANY 3 READER) Does not apply Misc     Si each once for 1 dose.     Dispense:  1 each     Refill:  0    pioglitazone 30 MG Oral Tab     Sig: Take 1 tablet (30 mg total) by mouth daily.     Dispense:  90 tablet     Refill:  1    metFORMIN  MG Oral Tablet 24 Hr     Sig: Take 2 tablets (1,000 mg total) by mouth 2 (two) times daily with meals.     Dispense:  360 tablet     Refill:  1     insulin glargine (LANTUS SOLOSTAR) 100 UNIT/ML Subcutaneous Solution Pen-injector     Sig: Inject 20 Units into the skin nightly.     Dispense:  18 mL     Refill:  0    Insulin Pen Needle (PEN NEEDLES) 32G X 6 MM Does not apply Misc     Si each 4 (four) times daily.     Dispense:  100 each     Refill:  2    glipiZIDE 5 MG Oral Tab     Sig: Take 1 tablet (5 mg total) by mouth 2 (two) times daily before meals.     Dispense:  60 tablet     Refill:  2    Empagliflozin (JARDIANCE) 25 MG Oral Tab     Sig: Take 1 each by mouth daily.     Dispense:  30 tablet     Refill:  1          This is a specialized patient consultation in endocrinology and required comprehensive review of prior records, as well as current evaluation, with time required for consideration of complex endocrine issues and consultation. For this visit, I personally interviewed the patient, and family member if accompanied, performed the pertinent parts of the history and physical examination. ROS included screening for appropriate endocrine conditions.   Today's diagnosis and plan were reviewed in detail with the patient who states understanding and agrees with plan. I discussed with the patient possible diagnosis, differential diagnosis, need for work up, treatment options, alternatives and side effects.     Please see note for details about time spent which includes:   · pre-visit preparation  · reviewing records  · face to face time with the patient   · timely documentation of the encounter  · ordering medications/tests  · communication with care team  · care coordination    I appreciate the opportunity to be part of your patient's medical care and will keep you, as the referring and primary physicians, informed about the care of your patient. Please feel free to contact me should you have any questions.      The 21st Century Cures Act makes medical notes like these available to patients in the interest of transparency. Please be advised this is  a medical document. Medical documents are intended to carry relevant information, facts as evident, and the clinical opinion of the practitioner. The medical note is intended as peer to peer communication and may appear blunt or direct. It is written in medical language and may contain abbreviations or verbiage that are unfamiliar.     Gill Shoemaker MD

## 2024-04-22 NOTE — PATIENT INSTRUCTIONS
RTC in 4 weeks with me   Consult diabetes educator ASAP     Lantus insulin once a day 20 units daily   Pioglitazone 30 mg /day   Jardiance 25 mg daily   Jardaince 25 mg daily   Metformin 500 mg daily - titrate as tolerated   Metformin start with 500 mg daily for a week, increase to 500 mg twice a day for a week, then 1000 mg at night and 500 mg in the morning for a week, then increase to 1000 mg twice day. If getting diarrhea, wait and do not increase the dose till the diarrhea resolves.       Stop drinking sugary drinks       Will give CGM samples and send Rx        If you have low blood sugar <70, take 15 grams of carb (8 oz juice or regular soda) and recheck in 15 minutes.    Follow up with podiatry and eye doctor annually.   Patients need to wear covered shoes all the time and check feet daily.   Bring your meter/BG log to the next visit.      Target A1c 6.5%  Target BG   BEFORE MEAL 100-130mg/dl  2hrs AFTER MEAL less than 180mg/dl    GLP-1 agonists:  No personal or family history of MEN syndrome   No personal history pancreatitis   Patient counselled regarding side effects including injection site reactions, nausea, vomiting, diarrhea, pancreatitis, gastroparesis and rare side effect kwame Kj syndrome.    SGLT2 inhibitors  No UTI or yeast infection   Discussed side effects including UTI and fungal infections.   Discussed the importance of hydration.

## 2024-07-12 ENCOUNTER — TELEPHONE (OUTPATIENT)
Dept: FAMILY MEDICINE CLINIC | Facility: CLINIC | Age: 59
End: 2024-07-12

## 2024-07-12 NOTE — TELEPHONE ENCOUNTER
Spoke with patient, re: care gaps, name and  verified, patient is out of town and will to come in for the labs.     Patient given lab hours for office and advised of  hours at [facility] to assist with his work hours.

## 2024-08-23 ENCOUNTER — TELEPHONE (OUTPATIENT)
Dept: FAMILY MEDICINE CLINIC | Facility: CLINIC | Age: 59
End: 2024-08-23

## 2024-08-23 NOTE — TELEPHONE ENCOUNTER
Called pt in regards to blood pressure f/u. Pt states he's currently being seen by a union doctor. I will be removing pt from pcp.

## 2024-08-24 ENCOUNTER — PATIENT OUTREACH (OUTPATIENT)
Dept: CASE MANAGEMENT | Age: 59
End: 2024-08-24

## 2024-08-24 NOTE — PROCEDURES
The office order for PCP removal request is Approved and finalized on August 24, 2024.    Removed Jose Eduardo Saavedra DO as the patient's Primary Care Physician

## (undated) NOTE — ED AVS SNAPSHOT
[facility] Emergency Department    [address] [address]    Phone:  [phone]    Fax:  [phone]           Luisana Bower   MRN: [id number]    Department:  [facility] Emergency Department   Date of Visit:  1 have any questions regarding your home medications, including potential side effects.               Medication List      START taking these medications     azithromycin 250 MG Tabs   Quantity:  1 Package   Commonly known as:  ZITHROMAX Z-ALEXANDER   500 mg once f to serve you. You are our top priority. You were examined and treated today on an urgent basis only. This was not a substitute for ongoing medical care. Often, one Emergency Department visit does not uncover every injury or illness.  If you have been r 24-Hour Pharmacies        Pharmacy Address Phone Number   [facility] [address] [facility] ([address]) [address] [facility] [place] [address]) [phone]   [facility] [address]  ([address] [facility]) [phone] If you have questions, you can call [phone] to talk to our [facility] Staff. Remember, [employer] is NOT to be used for urgent needs. For medical emergencies, dial 911.

## (undated) NOTE — Clinical Note
2/16/2017              Tez Lieberman        [address] [address]         To Whom It May Concern:    Mr. Gamaliel Montoya has been under my care for some years and now more acutely he has been seen over the last couple of weeks

## (undated) NOTE — MR AVS SNAPSHOT
[facility]  [address], [address]  [phone]               Thank you for choosing us for your health care visit with Lion Nguyễn DO.   We are glad to serve you and happy to provide you with this sum your Zip Code and Date of Birth to complete the sign-up process. If you do not sign up before the expiration date, you must request a new code.     Your unique Jia[employer] Access Code: [email or web address]  Expires: 3/18/2017  2:44 PM    If you have questions, you can c

## (undated) NOTE — LETTER
January 22, 2017    Patient: Rina Pepe   Date of Visit: 1/22/2017       To Whom It May Concern:    Niurka Solares was seen and treated in our emergency department on 1/22/2017. He is excused from work for the next 2-3 days. .    If you have a

## (undated) NOTE — MR AVS SNAPSHOT
[facility]  [address], [address]  [phone]               Thank you for choosing us for your health care visit with Kodak Jean DO.   We are glad to serve you and happy to provide you with this sum Where to Get Your Medications      These medications were sent to [place] [address] [address], [address] AT [address] [facility] [phone], [phone] [address] [address]     Phone:  533 have any questions related to insurance coverage. Thank you. Results of Recent Testing     OSTEOPATHIC MANIP,5-6 BODY REGN             MyChart     Sign up for [facility], your secure online medical record.   MedTest DX will allow you to access patient in Visit [facility] online at  [email or web address]

## (undated) NOTE — ED AVS SNAPSHOT
[facility] Emergency Department    [address]     [address] [place] [address]    Phone:  [phone]    Fax:  [phone]           Mulugeta Mueller   MRN: [id number]    Department:  [facility] Emergency Department   Date of Visit:  1 and Class Registration line at [phone] or find a doctor online by visiting [email or web address].    IF THERE IS ANY CHANGE OR WORSENING OF YOUR CONDITION, CALL YOUR PRIMARY CARE PHYSICIAN AT ONCE OR RETURN IMMEDIATELY TO [address] [place] [address].     If